# Patient Record
Sex: MALE | Race: OTHER
[De-identification: names, ages, dates, MRNs, and addresses within clinical notes are randomized per-mention and may not be internally consistent; named-entity substitution may affect disease eponyms.]

---

## 2020-02-27 ENCOUNTER — HOSPITAL ENCOUNTER (EMERGENCY)
Dept: HOSPITAL 8 - ED | Age: 40
Discharge: HOME | End: 2020-02-27
Payer: COMMERCIAL

## 2020-02-27 VITALS — HEIGHT: 67 IN | BODY MASS INDEX: 33.15 KG/M2 | WEIGHT: 211.2 LBS

## 2020-02-27 VITALS — DIASTOLIC BLOOD PRESSURE: 66 MMHG | SYSTOLIC BLOOD PRESSURE: 109 MMHG

## 2020-02-27 DIAGNOSIS — L02.612: Primary | ICD-10-CM

## 2020-02-27 PROCEDURE — 99283 EMERGENCY DEPT VISIT LOW MDM: CPT

## 2021-03-10 ENCOUNTER — HOSPITAL ENCOUNTER (EMERGENCY)
Dept: HOSPITAL 8 - ED | Age: 41
LOS: 1 days | Discharge: HOME | End: 2021-03-11
Payer: COMMERCIAL

## 2021-03-10 VITALS — WEIGHT: 222.67 LBS | BODY MASS INDEX: 35.79 KG/M2 | HEIGHT: 66 IN

## 2021-03-10 DIAGNOSIS — S29.012A: Primary | ICD-10-CM

## 2021-03-10 DIAGNOSIS — R07.89: ICD-10-CM
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
## 2021-03-10 DIAGNOSIS — M62.830: ICD-10-CM

## 2021-03-10 DIAGNOSIS — M54.2: ICD-10-CM
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
## 2021-03-10 DIAGNOSIS — R11.0: ICD-10-CM

## 2021-03-10 DIAGNOSIS — R53.83: ICD-10-CM
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
## 2021-03-10 PROCEDURE — 71045 X-RAY EXAM CHEST 1 VIEW: CPT

## 2021-03-10 PROCEDURE — 96375 TX/PRO/DX INJ NEW DRUG ADDON: CPT

## 2021-03-10 PROCEDURE — 84484 ASSAY OF TROPONIN QUANT: CPT

## 2021-03-10 PROCEDURE — 85025 COMPLETE CBC W/AUTO DIFF WBC: CPT

## 2021-03-10 PROCEDURE — 96374 THER/PROPH/DIAG INJ IV PUSH: CPT

## 2021-03-10 PROCEDURE — 36415 COLL VENOUS BLD VENIPUNCTURE: CPT

## 2021-03-10 PROCEDURE — 80053 COMPREHEN METABOLIC PANEL: CPT

## 2021-03-10 PROCEDURE — 93005 ELECTROCARDIOGRAM TRACING: CPT

## 2021-03-10 PROCEDURE — 99285 EMERGENCY DEPT VISIT HI MDM: CPT

## 2021-03-10 PROCEDURE — 83880 ASSAY OF NATRIURETIC PEPTIDE: CPT

## 2021-03-11 VITALS — SYSTOLIC BLOOD PRESSURE: 121 MMHG | DIASTOLIC BLOOD PRESSURE: 62 MMHG

## 2021-03-11 LAB
ALBUMIN SERPL-MCNC: 3.7 G/DL (ref 3.4–5)
ALP SERPL-CCNC: 62 U/L (ref 45–117)
ALT SERPL-CCNC: 40 U/L (ref 12–78)
ANION GAP SERPL CALC-SCNC: 7 MMOL/L (ref 5–15)
BASOPHILS # BLD AUTO: 0.1 X10^3/UL (ref 0–0.1)
BASOPHILS NFR BLD AUTO: 1 % (ref 0–1)
BILIRUB SERPL-MCNC: 1.1 MG/DL (ref 0.2–1)
CALCIUM SERPL-MCNC: 8.5 MG/DL (ref 8.5–10.1)
CHLORIDE SERPL-SCNC: 111 MMOL/L (ref 98–107)
CREAT SERPL-MCNC: 1.16 MG/DL (ref 0.7–1.3)
EOSINOPHIL # BLD AUTO: 0.2 X10^3/UL (ref 0–0.4)
EOSINOPHIL NFR BLD AUTO: 2 % (ref 1–7)
ERYTHROCYTE [DISTWIDTH] IN BLOOD BY AUTOMATED COUNT: 13.7 % (ref 9.4–14.8)
LYMPHOCYTES # BLD AUTO: 1.7 X10^3/UL (ref 1–3.4)
LYMPHOCYTES NFR BLD AUTO: 12 % (ref 22–44)
MCH RBC QN AUTO: 31 PG (ref 27.5–34.5)
MCHC RBC AUTO-ENTMCNC: 34 G/DL (ref 33.2–36.2)
MD: NO
MONOCYTES # BLD AUTO: 1 X10^3/UL (ref 0.2–0.8)
MONOCYTES NFR BLD AUTO: 7 % (ref 2–9)
NEUTROPHILS # BLD AUTO: 10.8 X10^3/UL (ref 1.8–6.8)
NEUTROPHILS NFR BLD AUTO: 79 % (ref 42–75)
PLATELET # BLD AUTO: 195 X10^3/UL (ref 130–400)
PMV BLD AUTO: 11.3 FL (ref 7.4–10.4)
PROT SERPL-MCNC: 7.6 G/DL (ref 6.4–8.2)
RBC # BLD AUTO: 4.96 X10^6/UL (ref 4.38–5.82)
TROPONIN I SERPL-MCNC: < 0.015 NG/ML (ref 0–0.04)

## 2021-03-11 NOTE — NUR
DISCHARGE INSTRUCTIONS REVIEWED WITH PATIENT. NO FURTHER QUESTIONS AT THIS 
TIME. PRESCRIPTION HANDED DIRECTLY TO PATIENT. WORK NOTE AS WELL.IV REMOVED PER 
DC PROTOCOL. ALL PERSONAL BELONGINGS WITH PATIENT ON DISCHARGE. STEADY GAIT TO 
LOBBY. EDUCATION ON RESTRICTIONS WITH NEW PRESCRIPTION OF FLEXERIL REVIEWED 
WITH PATIENT AND HE VOICED UNDERSTANDING.

## 2021-03-11 NOTE — NUR
DARSHAN RESTING IN BED IN NAD. CALL BELL IN REACH. IV PLACED. DR. MCCORD TO 
BEDSIDE AND INSTRUCTED RN TO HOLD OFF ON DMINISTERING NITRO PASTE AFTER HIS 
ASSESSMENT. PATIENT DECLINES ANY PAIN MEDICATIONS AT THIS TIME BUT ACCEPTS 
TORADOL. WILL CONTINUE TO MONITOR

## 2021-03-15 ENCOUNTER — HOSPITAL ENCOUNTER (EMERGENCY)
Dept: HOSPITAL 8 - ED | Age: 41
Discharge: HOME | End: 2021-03-15
Payer: COMMERCIAL

## 2021-03-15 VITALS — BODY MASS INDEX: 35.29 KG/M2 | HEIGHT: 66 IN | WEIGHT: 219.58 LBS

## 2021-03-15 VITALS — SYSTOLIC BLOOD PRESSURE: 120 MMHG | DIASTOLIC BLOOD PRESSURE: 80 MMHG

## 2021-03-15 DIAGNOSIS — U07.1: Primary | ICD-10-CM

## 2021-03-15 DIAGNOSIS — B34.9: ICD-10-CM

## 2021-03-15 PROCEDURE — 99283 EMERGENCY DEPT VISIT LOW MDM: CPT

## 2021-03-15 PROCEDURE — U0003 INFECTIOUS AGENT DETECTION BY NUCLEIC ACID (DNA OR RNA); SEVERE ACUTE RESPIRATORY SYNDROME CORONAVIRUS 2 (SARS-COV-2) (CORONAVIRUS DISEASE [COVID-19]), AMPLIFIED PROBE TECHNIQUE, MAKING USE OF HIGH THROUGHPUT TECHNOLOGIES AS DESCRIBED BY CMS-2020-01-R: HCPCS

## 2021-03-15 NOTE — NUR
ASSUMED CARE OF PATIENT. PATIENT REPORTS HE HAS HAD BODY ACHES AND FEVERS X2 
DAYS AND HIS WORK WON'T LET HIM COME BACK UNTIL HE HAS BEEN TESTED FOR COVID. 
VS STABLE. NO ACUTE DISTRESS NOTED. CALL LIGHT IN PLACE. WILL CONTINUE TO 
MONITOR.

## 2021-03-25 ENCOUNTER — HOSPITAL ENCOUNTER (OUTPATIENT)
Dept: HOSPITAL 8 - ED | Age: 41
Setting detail: OBSERVATION
LOS: 1 days | Discharge: HOME | End: 2021-03-26
Attending: ORTHOPAEDIC SURGERY | Admitting: ORTHOPAEDIC SURGERY
Payer: COMMERCIAL

## 2021-03-25 VITALS — SYSTOLIC BLOOD PRESSURE: 129 MMHG | DIASTOLIC BLOOD PRESSURE: 81 MMHG

## 2021-03-25 VITALS — WEIGHT: 211.64 LBS | HEIGHT: 66 IN | BODY MASS INDEX: 34.01 KG/M2

## 2021-03-25 DIAGNOSIS — S68.115A: Primary | ICD-10-CM

## 2021-03-25 DIAGNOSIS — S68.117A: ICD-10-CM

## 2021-03-25 DIAGNOSIS — U07.1: ICD-10-CM

## 2021-03-25 DIAGNOSIS — Z23: ICD-10-CM

## 2021-03-25 DIAGNOSIS — F12.90: ICD-10-CM
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
## 2021-03-25 DIAGNOSIS — Z78.9: ICD-10-CM

## 2021-03-25 LAB
ALBUMIN SERPL-MCNC: 4.1 G/DL (ref 3.4–5)
ALP SERPL-CCNC: 67 U/L (ref 45–117)
ALT SERPL-CCNC: 74 U/L (ref 12–78)
ANION GAP SERPL CALC-SCNC: 10 MMOL/L (ref 5–15)
BASOPHILS # BLD AUTO: 0.1 X10^3/UL (ref 0–0.1)
BASOPHILS NFR BLD AUTO: 1 % (ref 0–1)
BILIRUB SERPL-MCNC: 1.2 MG/DL (ref 0.2–1)
CALCIUM SERPL-MCNC: 8.9 MG/DL (ref 8.5–10.1)
CHLORIDE SERPL-SCNC: 112 MMOL/L (ref 98–107)
CREAT SERPL-MCNC: 1.35 MG/DL (ref 0.7–1.3)
EOSINOPHIL # BLD AUTO: 0.3 X10^3/UL (ref 0–0.4)
EOSINOPHIL NFR BLD AUTO: 2 % (ref 1–7)
ERYTHROCYTE [DISTWIDTH] IN BLOOD BY AUTOMATED COUNT: 13.5 % (ref 9.4–14.8)
LYMPHOCYTES # BLD AUTO: 3.5 X10^3/UL (ref 1–3.4)
LYMPHOCYTES NFR BLD AUTO: 32 % (ref 22–44)
MCH RBC QN AUTO: 31.1 PG (ref 27.5–34.5)
MCHC RBC AUTO-ENTMCNC: 35.1 G/DL (ref 33.2–36.2)
MD: NO
MONOCYTES # BLD AUTO: 0.8 X10^3/UL (ref 0.2–0.8)
MONOCYTES NFR BLD AUTO: 8 % (ref 2–9)
NEUTROPHILS # BLD AUTO: 6.1 X10^3/UL (ref 1.8–6.8)
NEUTROPHILS NFR BLD AUTO: 57 % (ref 42–75)
PLATELET # BLD AUTO: 262 X10^3/UL (ref 130–400)
PMV BLD AUTO: 10.1 FL (ref 7.4–10.4)
PROT SERPL-MCNC: 8.4 G/DL (ref 6.4–8.2)
RBC # BLD AUTO: 5.3 X10^6/UL (ref 4.38–5.82)

## 2021-03-25 PROCEDURE — 26951 AMPUTATION OF FINGER/THUMB: CPT

## 2021-03-25 PROCEDURE — G0378 HOSPITAL OBSERVATION PER HR: HCPCS

## 2021-03-25 PROCEDURE — 80053 COMPREHEN METABOLIC PANEL: CPT

## 2021-03-25 PROCEDURE — 90471 IMMUNIZATION ADMIN: CPT

## 2021-03-25 PROCEDURE — 85025 COMPLETE CBC W/AUTO DIFF WBC: CPT

## 2021-03-25 PROCEDURE — 90715 TDAP VACCINE 7 YRS/> IM: CPT

## 2021-03-25 PROCEDURE — 96365 THER/PROPH/DIAG IV INF INIT: CPT

## 2021-03-25 PROCEDURE — 73130 X-RAY EXAM OF HAND: CPT

## 2021-03-25 PROCEDURE — 87635 SARS-COV-2 COVID-19 AMP PRB: CPT

## 2021-03-25 PROCEDURE — 36415 COLL VENOUS BLD VENIPUNCTURE: CPT

## 2021-03-25 PROCEDURE — 99284 EMERGENCY DEPT VISIT MOD MDM: CPT

## 2021-03-25 RX ADMIN — OXYCODONE HYDROCHLORIDE PRN MG: 5 TABLET ORAL at 23:57

## 2021-03-25 NOTE — NUR
pt here for finger amputation to left ring and pinky finger. Fingers were cut 
while using a  saw. Bleeding controlled. PA at bedside doing a digital 
block. piv placed and labs drawn. Pt says pain is now improved. Pt placed on 
monitors. friend at bedside. VSS. waiting for further orders.

## 2021-03-25 NOTE — NUR
covid swab collected and walked to lab. pt resting with no needs at this molly. 
call light in reach.

## 2021-03-26 VITALS — SYSTOLIC BLOOD PRESSURE: 103 MMHG | DIASTOLIC BLOOD PRESSURE: 65 MMHG

## 2021-03-26 RX ADMIN — OXYCODONE HYDROCHLORIDE PRN MG: 5 TABLET ORAL at 03:45

## 2021-03-26 RX ADMIN — OXYCODONE HYDROCHLORIDE PRN MG: 5 TABLET ORAL at 09:06

## 2021-08-19 ENCOUNTER — HOSPITAL ENCOUNTER (EMERGENCY)
Dept: HOSPITAL 8 - ED | Age: 41
Discharge: HOME | End: 2021-08-19
Payer: COMMERCIAL

## 2021-08-19 VITALS — SYSTOLIC BLOOD PRESSURE: 122 MMHG | DIASTOLIC BLOOD PRESSURE: 84 MMHG

## 2021-08-19 VITALS — WEIGHT: 217.38 LBS | BODY MASS INDEX: 34.93 KG/M2 | HEIGHT: 66 IN
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
## 2021-08-19 DIAGNOSIS — S51.812A: Primary | ICD-10-CM
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
## 2021-08-19 PROCEDURE — 12001 RPR S/N/AX/GEN/TRNK 2.5CM/<: CPT

## 2021-08-19 PROCEDURE — 99282 EMERGENCY DEPT VISIT SF MDM: CPT

## 2023-05-23 ENCOUNTER — OFFICE VISIT (OUTPATIENT)
Dept: OCCUPATIONAL MEDICINE | Age: 43
End: 2023-05-23

## 2023-05-23 VITALS
TEMPERATURE: 98 F | SYSTOLIC BLOOD PRESSURE: 112 MMHG | RESPIRATION RATE: 14 BRPM | DIASTOLIC BLOOD PRESSURE: 86 MMHG | OXYGEN SATURATION: 97 % | HEART RATE: 72 BPM

## 2023-05-23 DIAGNOSIS — H65.193 OTHER ACUTE NONSUPPURATIVE OTITIS MEDIA OF BOTH EARS, RECURRENCE NOT SPECIFIED: ICD-10-CM

## 2023-05-23 DIAGNOSIS — R06.2 WHEEZING: ICD-10-CM

## 2023-05-23 DIAGNOSIS — J02.9 SORE THROAT: Primary | ICD-10-CM

## 2023-05-23 DIAGNOSIS — J02.0 PHARYNGITIS DUE TO STREPTOCOCCUS SPECIES: ICD-10-CM

## 2023-05-23 LAB
GROUP A STREP ANTIGEN, POC: POSITIVE
VALID INTERNAL CONTROL, POC: YES

## 2023-05-23 RX ORDER — AMOXICILLIN 500 MG/1
500 CAPSULE ORAL 2 TIMES DAILY
Qty: 20 CAPSULE | Refills: 0 | Status: SHIPPED | OUTPATIENT
Start: 2023-05-23 | End: 2023-06-02

## 2023-05-23 ASSESSMENT — ENCOUNTER SYMPTOMS
VOMITING: 0
WHEEZING: 0
DIARRHEA: 0
SHORTNESS OF BREATH: 0
SORE THROAT: 1
SINUS PAIN: 1
COUGH: 1
SINUS PRESSURE: 1
ABDOMINAL PAIN: 0

## 2023-05-23 NOTE — PROGRESS NOTES
Neurological:  Positive for headaches. Negative for dizziness. OBJECTIVE:  /86   Pulse 72   Temp 98 °F (36.7 °C)   Resp 14   SpO2 97%      POC strep test- positive    Physical Exam  Vitals reviewed. Constitutional:       General: He is not in acute distress. Appearance: Normal appearance. He is ill-appearing. HENT:      Head: Normocephalic. Right Ear: Ear canal and external ear normal. Decreased hearing noted. No tenderness. Tympanic membrane is injected, erythematous and bulging. Tympanic membrane is not perforated. Left Ear: Ear canal and external ear normal. Decreased hearing noted. No tenderness. Tympanic membrane is injected, erythematous and bulging. Nose: Mucosal edema and congestion present. Right Sinus: Maxillary sinus tenderness and frontal sinus tenderness present. Left Sinus: Maxillary sinus tenderness and frontal sinus tenderness present. Mouth/Throat:      Lips: Pink. Mouth: Mucous membranes are moist.      Pharynx: Uvula midline. Pharyngeal swelling and posterior oropharyngeal erythema present. No oropharyngeal exudate or uvula swelling. Tonsils: No tonsillar exudate or tonsillar abscesses. 3+ on the right. 2+ on the left. Cardiovascular:      Rate and Rhythm: Normal rate and regular rhythm. Heart sounds: Normal heart sounds. Pulmonary:      Effort: Pulmonary effort is normal. No respiratory distress. Breath sounds: No stridor. Wheezing present. Comments: Scattered expiratory wheezing through bilateral lung, mild congestion clearing with cough  Skin:     General: Skin is warm and dry. Neurological:      General: No focal deficit present. Mental Status: He is alert and oriented to person, place, and time. Psychiatric:         Mood and Affect: Mood normal.         Behavior: Behavior normal.         Thought Content:  Thought content normal.         Judgment: Judgment normal.       ASSESSMENT and PLAN

## 2023-05-25 ENCOUNTER — TELEPHONE (OUTPATIENT)
Dept: OCCUPATIONAL MEDICINE | Age: 43
End: 2023-05-25

## 2023-05-25 NOTE — TELEPHONE ENCOUNTER
Called to f/u with Patient. He reports his symptoms are improving. Feeling much better ears just still feel \"clogged\". He is off work today but will come in for an appointment on Thursday June 1 at 10 am to have ears rechecked.     Continue antibiotics as prescribe  Recommend- Claritin daily and Flonase daily- he reports he has that at home    Denies questions or concerns

## 2023-06-01 ENCOUNTER — TELEPHONE (OUTPATIENT)
Dept: OCCUPATIONAL MEDICINE | Age: 43
End: 2023-06-01

## 2023-06-01 NOTE — TELEPHONE ENCOUNTER
Return call from [place]. He reports he is feeling better and denies c/o ears hurting or sore throat    He reports his ears occasionally feel clogged but resolve on there own without treatment and no discomfort    Advised to f/u in the clinic to have ears rechecked.  Patient reports he will if not resolved  He denies questions or concerns

## 2023-06-01 NOTE — TELEPHONE ENCOUNTER
Called to f/u with patient to see how he was feeling after appointment on Tuesday, May 23rd. Appointment scheduled today but missed appointment   No answer. Left message to f/u in the [facility] if any concerns or questions.  Phone Number and Clinic hours left on message

## 2023-11-02 ENCOUNTER — OFFICE VISIT (OUTPATIENT)
Dept: OCCUPATIONAL MEDICINE | Age: 43
End: 2023-11-02

## 2023-11-02 VITALS
TEMPERATURE: 98.2 F | DIASTOLIC BLOOD PRESSURE: 82 MMHG | HEART RATE: 80 BPM | OXYGEN SATURATION: 95 % | SYSTOLIC BLOOD PRESSURE: 120 MMHG | RESPIRATION RATE: 16 BRPM

## 2023-11-02 DIAGNOSIS — Z76.0 MEDICATION REFILL: ICD-10-CM

## 2023-11-02 DIAGNOSIS — M79.642 PAIN OF LEFT HAND: Primary | ICD-10-CM

## 2023-11-02 RX ORDER — TIZANIDINE 4 MG/1
4 TABLET ORAL 3 TIMES DAILY PRN
COMMUNITY
Start: 2023-02-21 | End: 2023-11-02 | Stop reason: SDUPTHER

## 2023-11-02 RX ORDER — TIZANIDINE 4 MG/1
4 TABLET ORAL NIGHTLY PRN
Qty: 30 TABLET | Refills: 1 | Status: SHIPPED | OUTPATIENT
Start: 2023-11-02 | End: 2024-01-01

## 2023-11-02 RX ORDER — LIDOCAINE 50 MG/G
1 PATCH TOPICAL DAILY
COMMUNITY
Start: 2023-08-05

## 2023-11-02 ASSESSMENT — ENCOUNTER SYMPTOMS
COUGH: 0
SORE THROAT: 0
EYE REDNESS: 0
DIARRHEA: 0
CHEST TIGHTNESS: 0
ABDOMINAL PAIN: 0
NAUSEA: 0
VOMITING: 0
SINUS PAIN: 0
SHORTNESS OF BREATH: 0